# Patient Record
Sex: MALE | Race: WHITE | URBAN - METROPOLITAN AREA
[De-identification: names, ages, dates, MRNs, and addresses within clinical notes are randomized per-mention and may not be internally consistent; named-entity substitution may affect disease eponyms.]

---

## 2017-10-10 ENCOUNTER — TRANSFERRED RECORDS (OUTPATIENT)
Dept: HEALTH INFORMATION MANAGEMENT | Facility: CLINIC | Age: 67
End: 2017-10-10

## 2017-10-12 ENCOUNTER — MEDICAL CORRESPONDENCE (OUTPATIENT)
Dept: HEALTH INFORMATION MANAGEMENT | Facility: CLINIC | Age: 67
End: 2017-10-12

## 2017-10-16 ENCOUNTER — MEDICAL CORRESPONDENCE (OUTPATIENT)
Dept: HEALTH INFORMATION MANAGEMENT | Facility: CLINIC | Age: 67
End: 2017-10-16

## 2018-04-12 NOTE — TELEPHONE ENCOUNTER
FUTURE VISIT INFORMATION      FUTURE VISIT INFORMATION:    Date: 4.16.18    Time: 1:35 PM     Location: [facility]  REFERRAL INFORMATION:    Referring provider:  Dr. Megan Cedillo    Referring providers clinic:  [facility]    Reason for visit/diagnosis  Aspirin Allergy Desensita    RECORDS REQUESTED FROM:       Clinic name Comments Records Status Imaging Status   [facility]  Received N/A                                   RECORDS STATUS      RECORDS RECEIVED FROM: [facility]   DATE RECEIVED: 4.16.18   NOTES STATUS DETAILS   OFFICE NOTE from referring provider Internal- Referral available Dr. Megan Cedillo  10.16.17  Dr. Sylvia Jones  1.30.18   OFFICE NOTE from other specialist N/A    DISCHARGE SUMMARY from hospital N/A    DISCHARGE REPORT from the ER N/A    OPERATIVE REPORT N/A    MEDICATION LIST Internal    IMAGING  (NEED IMAGES AND REPORTS)     CT SCAN N/A    CHEST XRAY (CXR) N/A    TESTS     PULMONARY FUNCTION TESTING (PFT) N/A    FLOW LOOP VOLUME (FVL) N/A    CYSTIC FIBROSIS     CF SPUTUM CULTURE N/A       *Referral notes indicated Aspirin desensitization testing was done greater than 2 years ago.

## 2018-04-16 ENCOUNTER — PRE VISIT (OUTPATIENT)
Dept: PULMONOLOGY | Facility: CLINIC | Age: 68
End: 2018-04-16

## 2018-04-16 ENCOUNTER — OFFICE VISIT (OUTPATIENT)
Dept: PULMONOLOGY | Facility: CLINIC | Age: 68
End: 2018-04-16
Attending: ALLERGY & IMMUNOLOGY
Payer: COMMERCIAL

## 2018-04-16 VITALS
WEIGHT: 199 LBS | RESPIRATION RATE: 16 BRPM | OXYGEN SATURATION: 99 % | HEART RATE: 52 BPM | HEIGHT: 67 IN | DIASTOLIC BLOOD PRESSURE: 67 MMHG | BODY MASS INDEX: 31.23 KG/M2 | SYSTOLIC BLOOD PRESSURE: 116 MMHG

## 2018-04-16 DIAGNOSIS — T50.905D ADVERSE EFFECT OF DRUG, SUBSEQUENT ENCOUNTER: Primary | ICD-10-CM

## 2018-04-16 PROCEDURE — G0463 HOSPITAL OUTPT CLINIC VISIT: HCPCS | Mod: ZF

## 2018-04-16 RX ORDER — RANOLAZINE 500 MG/1
500 TABLET, EXTENDED RELEASE ORAL
COMMUNITY

## 2018-04-16 RX ORDER — NITROGLYCERIN 0.4 MG/1
TABLET SUBLINGUAL
COMMUNITY
Start: 2018-03-12 | End: 2019-03-13

## 2018-04-16 RX ORDER — EPINEPHRINE 0.3 MG/.3ML
0.3 INJECTION SUBCUTANEOUS PRN
COMMUNITY

## 2018-04-16 ASSESSMENT — PAIN SCALES - GENERAL: PAINLEVEL: NO PAIN (0)

## 2018-04-16 NOTE — LETTER
4/16/2018       RE: Fatou Caballero  [address] [address]     Dear Colleague,    Thank you for referring your patient, Fatou Caballero, to the [facility] at [facility] Please see a copy of my visit note below.    Reason for Visit  Fatou Caballero is a 67 year old male who is here for follow-up for ASA desensitization.      Allergy HPI  He had knee surgery and they accidentally stopped his ASA when he was hospitalized for 3 days.  No recent adverse reactions and he tolerated the ASA desensitization 2 years ago w/o any issues.  Reviewing his initial rxn it was immediate throat, eye and facial swelling with sense airway was closing.    The patient was seen and examined by Sumanth Manuel MD   Current Outpatient Prescriptions   Medication     ranolazine (RANEXA) 500 MG 12 hr tablet     METOPROLOL TARTRATE PO     AMLODIPINE BESYLATE PO     EPINEPHrine (EPIPEN/ADRENACLICK/OR ANY BX GENERIC EQUIV) 0.3 MG/0.3ML injection 2-pack     TAMSULOSIN HCL PO     VITAMIN D, CHOLECALCIFEROL, PO     Coenzyme Q10 (CO Q 10 PO)     nitroGLYcerin (NITROSTAT) 0.4 MG sublingual tablet     COMPOUNDED NON-CONTROLLED SUBSTANCE (CMPD RX) - PHARMACY TO MIX COMPOUNDED MEDICATION     ISOSORBIDE MONONITRATE PO     METFORMIN HCL PO     OMEPRAZOLE PO     SIMVASTATIN PO     No current facility-administered medications for this visit.      Allergies   Allergen Reactions     Aspirin      Azithromycin      Gabapentin      Ibuprofen      Sulfamethoxazole-Trimethoprim      Social History     Social History     Marital status:      Spouse name: N/A     Number of children: N/A     Years of education: N/A     Occupational History     Not on file.     Social History Main Topics     Smoking status: Former Smoker     Smokeless tobacco: Never Used     Alcohol use 0.0 oz/week     0 Standard drinks or equivalent per week      Comment: weekly     Drug use: No  "    Sexual activity: Not on file     Other Topics Concern     Not on file     Social History Narrative     Past Medical History:   Diagnosis Date     Basal cell carcinoma      Past Surgical History:   Procedure Laterality Date     MOHS MICROGRAPHIC PROCEDURE       Family History   Problem Relation Age of Onset     Thyroid Disease No family hx of          ROS   A complete ROS was otherwise negative except as noted in the HPI.  /67  Pulse 52  Resp 16  Ht 1.702 m (5' 7\")  Wt 90.3 kg (199 lb)  SpO2 99%  BMI 31.17 kg/m2  Exam:   GENERAL APPEARANCE: Well developed, well nourished, alert, and in no apparent distress.  EYES: EOMI, conjunctiva clear non-injected  HENT: No discharge.  MOUTH: Oral mucosa is moist, without any lesions, no tonsillar enlargement, no oropharyngeal exudate.  RESP: Good air flow throughout.  No crackles. No rhonchi. No wheezes.  CV: Normal S1, S2, regular rhythm, normal rate. No murmur.  No rub. No gallop. No LE edema.   MS: Extremities normal. No clubbing. No cyanosis.  SKIN: No rashes noted.  NEURO: Speech normal, normal strength and tone, normal gait and stance  PSYCH: Normal mentation, orientation to person, place, and time.  Results:      Assessment and plan: Fatou did well during the last 2 desensitizations so we will do the same protocol but we will do it in the clinic.      Per American Academy of Allergy, Asthma and Immunology Drug Practice Parameter:  Rapid Aspirin Challenge/Desensitization Protocol for  Patients With Coronary Artery Disease Requiring Aspirin  Timea Aspirin dose, mg   0           0.1  15         0.3  30         1  45         3  60        10  75        20  90        40  105      81  every 20 minutes we will increase the dose.      Again, thank you for allowing me to participate in the care of your patient.      Sincerely,    Sumanth Manuel MD  "

## 2018-04-16 NOTE — MR AVS SNAPSHOT
"              After Visit Summary   4/16/2018    Fatou Caballero    MRN: [id number]           Patient Information     Date Of Birth          1950        Visit Information        Provider Department      4/16/2018 1:35 PM Sumanth Dela Cruz MD [facility]        Today's Diagnoses     Adverse effect of drug, subsequent encounter    -  1       Follow-ups after your visit        Follow-up notes from your care team     Return in about 4 weeks (around 5/14/2018).      Your next 10 appointments already scheduled     May 07, 2018  7:55 AM CDT   (Arrive by 7:40 AM)   Return Allergy with Sumanth Dela Cruz MD   [facility] ([facility])    [address]  [address] [place] [id number] [phone]           Do not take anti-histamines or Zantac for seven day prior to your appointment.              Who to contact     If you have questions or need follow up information about today's clinic visit or your schedule please contact [facility] directly at [phone].  Normal or non-critical lab and imaging results will be communicated to you by Picocenthart, letter or phone within 4 business days after the clinic has received the results. If you do not hear from us within 7 days, please contact the clinic through Picocenthart or phone. If you have a critical or abnormal lab result, we will notify you by phone as soon as possible.  Submit refill requests through [email or web address] or call your pharmacy and they will forward the refill request to us. Please allow 3 business days for your refill to be completed.          Additional Information About Your Visit        Picocenthart Information     [email or web address] lets you send messages to your doctor, view your test results, renew your prescriptions, schedule appointments and more. To sign up, go to [email or web address] . Click on \"Log in\" on the left side of the " "screen, which will take you to the Welcome page. Then click on \"Sign up Now\" on the right side of the page.     You will be asked to enter the access code listed below, as well as some personal information. Please follow the directions to create your username and password.     Your access code is: [email or web address]  Expires: 2018  7:43 AM     Your access code will  in 90 days. If you need help or a new code, please call your [facility] or [phone].        Care EveryWhere ID     This is your Care EveryWhere ID. This could be used by other organizations to access your [place] medical records  [id number]        Your Vitals Were     Pulse Respirations Height Pulse Oximetry BMI (Body Mass Index)       52 16 1.702 m (5' 7\") 99% 31.17 kg/m2        Blood Pressure from Last 3 Encounters:   18 116/67   12/29/15 122/74   12/09/15 130/67    Weight from Last 3 Encounters:   18 90.3 kg (199 lb)   12/29/15 93.4 kg (205 lb 14.6 oz)   12/09/15 95.7 kg (211 lb)              Today, you had the following     No orders found for display         Today's Medication Changes          These changes are accurate as of 18 11:59 PM.  If you have any questions, ask your nurse or doctor.               Start taking these medicines.        Dose/Directions    COMPOUNDED NON-CONTROLLED SUBSTANCE - PHARMACY TO MIX COMPOUNDED MEDICATION   Commonly known as:  CMPD RX   Used for:  Adverse effect of drug, subsequent encounter   Started by:  Sumanth Dela Cruz MD        Aspirin doses in m.1, 0.3, 1, 3, 10 , 20, 40, 81  For oral desensitization   Quantity:  1 Box   Refills:  0            Where to get your medicines      Some of these will need a paper prescription and others can be bought over the counter.  Ask your nurse if you have questions.     Bring a paper prescription for each of these medications     COMPOUNDED NON-CONTROLLED SUBSTANCE - PHARMACY TO MIX COMPOUNDED MEDICATION                Primary Care " Provider Fax #    Physician No Ref-Primary [phone]       No address on file        Equal Access to Services     HERMILO KIM : Hadii ban juarez salina Fontenot, michelle garcia, john mejia, ivon mendenhall mateuszmia liu lahernankhris cisneros. So [facility] [phone].    ATENCIÓN: Si habla español, tiene a huffman disposición servicios gratuitos de asistencia lingüística. Mireilleame al [phone].    We comply with applicable federal civil rights laws and [place] laws. We do not discriminate on the basis of race, color, national origin, age, disability, sex, sexual orientation, or gender identity.            Thank you!     Thank you for choosing [facility]  for your care. Our goal is always to provide you with excellent care. Hearing back from our patients is one way we can continue to improve our services. Please take a few minutes to complete the written survey that you may receive in the mail after your visit with us. Thank you!             Your Updated Medication List - Protect others around you: Learn how to safely use, store and throw away your medicines at www.disposemymeds.org.          This list is accurate as of 18 11:59 PM.  Always use your most recent med list.                   Brand Name Dispense Instructions for use Diagnosis    AMLODIPINE BESYLATE PO      Take 5 mg by mouth        CO Q 10 PO           COMPOUNDED NON-CONTROLLED SUBSTANCE - PHARMACY TO MIX COMPOUNDED MEDICATION    CMPD RX    1 Box    Aspirin doses in m.1, 0.3, 1, 3, 10 , 20, 40, 81  For oral desensitization    Adverse effect of drug, subsequent encounter       EPINEPHrine 0.3 MG/0.3ML injection 2-pack    EPIPEN/ADRENACLICK/or ANY BX GENERIC EQUIV     Inject 0.3 mg into the muscle as needed for anaphylaxis        ISOSORBIDE MONONITRATE PO      Take 30 mg by mouth daily        METFORMIN HCL PO      Take 500 mg by mouth        METOPROLOL TARTRATE PO           nitroGLYcerin 0.4 MG sublingual tablet     NITROSTAT     DISSOLVE ONE TABLET UNDER THE TONGUE EVERY DAY AS NEEDED FOR CHEST PAIN * MAY REPEAT EVERY 5 MINUTES--NO MORE THAN 3 TOTAL        OMEPRAZOLE PO      Take 20 mg by mouth        ranolazine 500 MG 12 hr tablet    RANEXA     Take 500 mg by mouth        SIMVASTATIN PO      Take 40 mg by mouth        TAMSULOSIN HCL PO           VITAMIN D (CHOLECALCIFEROL) PO      Take by mouth daily

## 2018-04-16 NOTE — NURSING NOTE
Chief Complaint   Patient presents with     Consult     Allergy desensitize to aspirin     Salvador Cordova CMA

## 2018-04-16 NOTE — PROGRESS NOTES
Reason for Visit  Fatou Caballero is a 67 year old male who is here for follow-up for ASA desensitization.      Allergy HPI  He had knee surgery and they accidentally stopped his ASA when he was hospitalized for 3 days.  No recent adverse reactions and he tolerated the ASA desensitization 2 years ago w/o any issues.  Reviewing his initial rxn it was immediate throat, eye and facial swelling with sense airway was closing.    The patient was seen and examined by Sumanth Manuel MD   Current Outpatient Prescriptions   Medication     ranolazine (RANEXA) 500 MG 12 hr tablet     METOPROLOL TARTRATE PO     AMLODIPINE BESYLATE PO     EPINEPHrine (EPIPEN/ADRENACLICK/OR ANY BX GENERIC EQUIV) 0.3 MG/0.3ML injection 2-pack     TAMSULOSIN HCL PO     VITAMIN D, CHOLECALCIFEROL, PO     Coenzyme Q10 (CO Q 10 PO)     nitroGLYcerin (NITROSTAT) 0.4 MG sublingual tablet     COMPOUNDED NON-CONTROLLED SUBSTANCE (CMPD RX) - PHARMACY TO MIX COMPOUNDED MEDICATION     ISOSORBIDE MONONITRATE PO     METFORMIN HCL PO     OMEPRAZOLE PO     SIMVASTATIN PO     No current facility-administered medications for this visit.      Allergies   Allergen Reactions     Aspirin      Azithromycin      Gabapentin      Ibuprofen      Sulfamethoxazole-Trimethoprim      Social History     Social History     Marital status:      Spouse name: N/A     Number of children: N/A     Years of education: N/A     Occupational History     Not on file.     Social History Main Topics     Smoking status: Former Smoker     Smokeless tobacco: Never Used     Alcohol use 0.0 oz/week     0 Standard drinks or equivalent per week      Comment: weekly     Drug use: No     Sexual activity: Not on file     Other Topics Concern     Not on file     Social History Narrative     Past Medical History:   Diagnosis Date     Basal cell carcinoma      Past Surgical History:   Procedure Laterality Date     MOHS MICROGRAPHIC PROCEDURE       Family History   Problem Relation Age of  "Onset     Thyroid Disease No family hx of          ROS   A complete ROS was otherwise negative except as noted in the HPI.  /67  Pulse 52  Resp 16  Ht 1.702 m (5' 7\")  Wt 90.3 kg (199 lb)  SpO2 99%  BMI 31.17 kg/m2  Exam:   GENERAL APPEARANCE: Well developed, well nourished, alert, and in no apparent distress.  EYES: EOMI, conjunctiva clear non-injected  HENT: No discharge.  MOUTH: Oral mucosa is moist, without any lesions, no tonsillar enlargement, no oropharyngeal exudate.  RESP: Good air flow throughout.  No crackles. No rhonchi. No wheezes.  CV: Normal S1, S2, regular rhythm, normal rate. No murmur.  No rub. No gallop. No LE edema.   MS: Extremities normal. No clubbing. No cyanosis.  SKIN: No rashes noted.  NEURO: Speech normal, normal strength and tone, normal gait and stance  PSYCH: Normal mentation, orientation to person, place, and time.  Results:      Assessment and plan: Fatou did well during the last 2 desensitizations so we will do the same protocol but we will do it in the clinic.      Per American Academy of Allergy, Asthma and Immunology Drug Practice Parameter:  Rapid Aspirin Challenge/Desensitization Protocol for  Patients With Coronary Artery Disease Requiring Aspirin  Timea Aspirin dose, mg   0           0.1  15         0.3  30         1  45         3  60        10  75        20  90        40  105      81  every 20 minutes we will increase the dose.    "

## 2018-05-07 ENCOUNTER — OFFICE VISIT (OUTPATIENT)
Dept: PULMONOLOGY | Facility: CLINIC | Age: 68
End: 2018-05-07
Attending: ALLERGY & IMMUNOLOGY
Payer: COMMERCIAL

## 2018-05-07 VITALS
BODY MASS INDEX: 31.39 KG/M2 | WEIGHT: 200 LBS | HEART RATE: 45 BPM | RESPIRATION RATE: 18 BRPM | DIASTOLIC BLOOD PRESSURE: 62 MMHG | SYSTOLIC BLOOD PRESSURE: 103 MMHG | OXYGEN SATURATION: 98 % | HEIGHT: 67 IN

## 2018-05-07 DIAGNOSIS — T50.905D ADVERSE EFFECT OF DRUG, SUBSEQUENT ENCOUNTER: Primary | ICD-10-CM

## 2018-05-07 PROCEDURE — G0463 HOSPITAL OUTPT CLINIC VISIT: HCPCS | Mod: ZF

## 2018-05-07 PROCEDURE — 95076 INGEST CHALLENGE INI 120 MIN: CPT | Mod: ZF | Performed by: ALLERGY & IMMUNOLOGY

## 2018-05-07 ASSESSMENT — PAIN SCALES - GENERAL: PAINLEVEL: NO PAIN (0)

## 2018-05-07 NOTE — LETTER
5/7/2018       RE: Fatou Caballero  [address] [address]     Dear Colleague,    Thank you for referring your patient, Fatou Caballero, to the [facility] at [facility]. Please see a copy of my visit note below.    Reason for Visit  Fatou Caballero is a 67 year old male who is here for follow-up for asa desensitization.      Allergy HPI  Feeling well today, here for asa desensitization.      The patient was seen and examined by Sumanth Manuel MD   Current Outpatient Prescriptions   Medication     AMLODIPINE BESYLATE PO     Coenzyme Q10 (CO Q 10 PO)     COMPOUNDED NON-CONTROLLED SUBSTANCE (CMPD RX) - PHARMACY TO MIX COMPOUNDED MEDICATION     EPINEPHrine (EPIPEN/ADRENACLICK/OR ANY BX GENERIC EQUIV) 0.3 MG/0.3ML injection 2-pack     ISOSORBIDE MONONITRATE PO     METFORMIN HCL PO     METOPROLOL TARTRATE PO     nitroGLYcerin (NITROSTAT) 0.4 MG sublingual tablet     OMEPRAZOLE PO     ranolazine (RANEXA) 500 MG 12 hr tablet     SIMVASTATIN PO     TAMSULOSIN HCL PO     VITAMIN D, CHOLECALCIFEROL, PO     No current facility-administered medications for this visit.      Allergies   Allergen Reactions     Aspirin      Azithromycin      Gabapentin      Ibuprofen      Sulfamethoxazole-Trimethoprim      Social History     Social History     Marital status:      Spouse name: N/A     Number of children: N/A     Years of education: N/A     Occupational History     Not on file.     Social History Main Topics     Smoking status: Former Smoker     Smokeless tobacco: Never Used     Alcohol use 0.0 oz/week     0 Standard drinks or equivalent per week      Comment: weekly     Drug use: No     Sexual activity: Not on file     Other Topics Concern     Not on file     Social History Narrative     Past Medical History:   Diagnosis Date     Basal cell carcinoma      Past Surgical History:   Procedure Laterality Date     MOHS MICROGRAPHIC  "PROCEDURE       Family History   Problem Relation Age of Onset     Thyroid Disease No family hx of          ROS   A complete ROS was otherwise negative except as noted in the HPI.  /62  Pulse (!) 45  Resp 18  Ht 1.702 m (5' 7\")  Wt 90.7 kg (200 lb)  SpO2 98%  BMI 31.32 kg/m2  Exam:   GENERAL APPEARANCE: Well developed, well nourished, alert, and in no apparent distress.  EYES: EOMI, conjunctiva clear non-injected  HENT: Nasal mucosa with no edema and no discharge. Nasal polyp in the left nare.    MOUTH: Oral mucosa is moist, without any lesions, no tonsillar enlargement, no oropharyngeal exudate.  RESP: Good air flow throughout.  No crackles. No rhonchi. No wheezes.  CV: Normal S1, S2, regular rhythm, normal rate. No murmur.  No rub. No gallop. No LE edema.   MS: Extremities normal. No clubbing. No cyanosis.  SKIN: No rashes noted.  NEURO: Speech normal, normal strength and tone, normal gait and stance  PSYCH: Normal mentation, orientation to person, place, and time.  Results:      Assessment and plan:  He is here for aspirin desensitization for an acute anaphylactic reaction to aspirin in the past.  Of note on exam today he does have nasal polyps.  He denies many nasal symptoms or chronic infections. The following doses were given and he was examined before every increase in dose by the MD.  He should continue daily aspirin 81 mg and if he missed more than 2 days he will notify us.  If someone wants to take him off this please notify us prior.      Time (a.m.) Aspirin dose, mg   7:37         0.1  7:55         0.3  8:13         1  8:32       3  8:52       10  9:10        20  9:31        40  9:50     81  Discharged in stable condition 10:25     Total time of visit was 3 hours.     Again, thank you for allowing me to participate in the care of your patient.      Sincerely,    Sumanth Manuel MD  "

## 2018-05-07 NOTE — NURSING NOTE
Chief Complaint   Patient presents with     RECHECK     Aspirin Desensitization     Alber Bellamy

## 2018-05-07 NOTE — PROGRESS NOTES
"Reason for Visit  Fatou Caballero is a 67 year old male who is here for follow-up for asa desensitization.      Allergy HPI  Feeling well today, here for asa desensitization.      The patient was seen and examined by Sumanth Manuel MD   Current Outpatient Prescriptions   Medication     AMLODIPINE BESYLATE PO     Coenzyme Q10 (CO Q 10 PO)     COMPOUNDED NON-CONTROLLED SUBSTANCE (CMPD RX) - PHARMACY TO MIX COMPOUNDED MEDICATION     EPINEPHrine (EPIPEN/ADRENACLICK/OR ANY BX GENERIC EQUIV) 0.3 MG/0.3ML injection 2-pack     ISOSORBIDE MONONITRATE PO     METFORMIN HCL PO     METOPROLOL TARTRATE PO     nitroGLYcerin (NITROSTAT) 0.4 MG sublingual tablet     OMEPRAZOLE PO     ranolazine (RANEXA) 500 MG 12 hr tablet     SIMVASTATIN PO     TAMSULOSIN HCL PO     VITAMIN D, CHOLECALCIFEROL, PO     No current facility-administered medications for this visit.      Allergies   Allergen Reactions     Aspirin      Azithromycin      Gabapentin      Ibuprofen      Sulfamethoxazole-Trimethoprim      Social History     Social History     Marital status:      Spouse name: N/A     Number of children: N/A     Years of education: N/A     Occupational History     Not on file.     Social History Main Topics     Smoking status: Former Smoker     Smokeless tobacco: Never Used     Alcohol use 0.0 oz/week     0 Standard drinks or equivalent per week      Comment: weekly     Drug use: No     Sexual activity: Not on file     Other Topics Concern     Not on file     Social History Narrative     Past Medical History:   Diagnosis Date     Basal cell carcinoma      Past Surgical History:   Procedure Laterality Date     MOHS MICROGRAPHIC PROCEDURE       Family History   Problem Relation Age of Onset     Thyroid Disease No family hx of          ROS   A complete ROS was otherwise negative except as noted in the HPI.  /62  Pulse (!) 45  Resp 18  Ht 1.702 m (5' 7\")  Wt 90.7 kg (200 lb)  SpO2 98%  BMI 31.32 kg/m2  Exam:   GENERAL " APPEARANCE: Well developed, well nourished, alert, and in no apparent distress.  EYES: EOMI, conjunctiva clear non-injected  HENT: Nasal mucosa with no edema and no discharge. Nasal polyp in the left nare.    MOUTH: Oral mucosa is moist, without any lesions, no tonsillar enlargement, no oropharyngeal exudate.  RESP: Good air flow throughout.  No crackles. No rhonchi. No wheezes.  CV: Normal S1, S2, regular rhythm, normal rate. No murmur.  No rub. No gallop. No LE edema.   MS: Extremities normal. No clubbing. No cyanosis.  SKIN: No rashes noted.  NEURO: Speech normal, normal strength and tone, normal gait and stance  PSYCH: Normal mentation, orientation to person, place, and time.  Results:      Assessment and plan:  He is here for aspirin desensitization for an acute anaphylactic reaction to aspirin in the past.  Of note on exam today he does have nasal polyps.  He denies many nasal symptoms or chronic infections. The following doses were given and he was examined before every increase in dose by the MD.  He should continue daily aspirin 81 mg and if he missed more than 2 days he will notify us.  If someone wants to take him off this please notify us prior.      Time (a.m.) Aspirin dose, mg   7:37         0.1  7:55         0.3  8:13         1  8:32       3  8:52       10  9:10        20  9:31        40  9:50     81  Discharged in stable condition 10:25     Total time of visit was 3 hours.   .

## 2018-05-07 NOTE — PATIENT INSTRUCTIONS
He should continue daily baby aspirin (81 mg) and if he missed more than 2 days he will notify us.  If someone wants to take him off this please notify us prior.

## 2018-05-07 NOTE — MR AVS SNAPSHOT
"              After Visit Summary   5/7/2018    Fatou Caballero    MRN: [id number]           Patient Information     Date Of Birth          1950        Visit Information        Provider Department      5/7/2018 7:55 AM Sumanth Dela Cruz MD [facility]        Today's Diagnoses     Adverse effect of drug, subsequent encounter    -  1      Care Instructions     He should continue daily baby aspirin (81 mg) and if he missed more than 2 days he will notify us.  If someone wants to take him off this please notify us prior.              Follow-ups after your visit        Who to contact     If you have questions or need follow up information about today's clinic visit or your schedule please contact [facility] directly at [phone].  Normal or non-critical lab and imaging results will be communicated to you by [employer], letter or phone within 4 business days after the clinic has received the results. If you do not hear from us within 7 days, please contact the clinic through [employer] or phone. If you have a critical or abnormal lab result, we will notify you by phone as soon as possible.  Submit refill requests through [employer] or call your pharmacy and they will forward the refill request to us. Please allow 3 business days for your refill to be completed.          Additional Information About Your Visit        [employer] Information     [employer] lets you send messages to your doctor, view your test results, renew your prescriptions, schedule appointments and more. To sign up, go to [email or web address] . Click on \"Log in\" on the left side of the screen, which will take you to the Welcome page. Then click on \"Sign up Now\" on the right side of the page.     You will be asked to enter the access code listed below, as well as some personal information. Please follow the directions to create your username and password.     Your access code is: " [email or web address]  Expires: 2018  7:43 AM     Your access code will  in 90 days. If you need help or a new code, please call your [facility] or [phone].        Care EveryWhere ID     This is your Care EveryWhere ID. This could be used by other organizations to access your [place] medical records  [id number]        Your Vitals Were     Pulse Respirations Height Pulse Oximetry BMI (Body Mass Index)       45 18 1.702 m (5' 7\") 98% 31.32 kg/m2        Blood Pressure from Last 3 Encounters:   18 103/62   18 116/67   12/29/15 122/74    Weight from Last 3 Encounters:   18 90.7 kg (200 lb)   18 90.3 kg (199 lb)   12/29/15 93.4 kg (205 lb 14.6 oz)              We Performed the Following     Ingestion challenge test-up to 120 minutes        Primary Care Provider Fax #    Physician No Ref-Primary [phone]       No address on file        Equal Access to Services     [facility]: Hadii aad ku hadasho Sorodolfo, waaxda luqadaha, qaybta kaalmada adeegyajoya, ivon nielson . So [facility] [phone].    ATENCIÓN: Si habla español, tiene a huffman disposición servicios gratuitos de asistencia lingüística. Llame al [phone].    We comply with applicable federal civil rights laws and [place] laws. We do not discriminate on the basis of race, color, national origin, age, disability, sex, sexual orientation, or gender identity.            Thank you!     Thank you for choosing [facility]  for your care. Our goal is always to provide you with excellent care. Hearing back from our patients is one way we can continue to improve our services. Please take a few minutes to complete the written survey that you may receive in the mail after your visit with us. Thank you!             Your Updated Medication List - Protect others around you: Learn how to safely use, store and throw away your medicines at www.disposemymeds.org.          This list is " accurate as of 18 10:27 AM.  Always use your most recent med list.                   Brand Name Dispense Instructions for use Diagnosis    AMLODIPINE BESYLATE PO      Take 5 mg by mouth        CO Q 10 PO           COMPOUNDED NON-CONTROLLED SUBSTANCE - PHARMACY TO MIX COMPOUNDED MEDICATION    CMPD RX    1 Box    Aspirin doses in m.1, 0.3, 1, 3, 10 , 20, 40, 81  For oral desensitization    Adverse effect of drug, subsequent encounter       EPINEPHrine 0.3 MG/0.3ML injection 2-pack    EPIPEN/ADRENACLICK/or ANY BX GENERIC EQUIV     Inject 0.3 mg into the muscle as needed for anaphylaxis        ISOSORBIDE MONONITRATE PO      Take 30 mg by mouth daily        METFORMIN HCL PO      Take 500 mg by mouth        METOPROLOL TARTRATE PO           nitroGLYcerin 0.4 MG sublingual tablet    NITROSTAT     DISSOLVE ONE TABLET UNDER THE TONGUE EVERY DAY AS NEEDED FOR CHEST PAIN * MAY REPEAT EVERY 5 MINUTES--NO MORE THAN 3 TOTAL        OMEPRAZOLE PO      Take 20 mg by mouth        ranolazine 500 MG 12 hr tablet    RANEXA     Take 500 mg by mouth        SIMVASTATIN PO      Take 40 mg by mouth        TAMSULOSIN HCL PO           VITAMIN D (CHOLECALCIFEROL) PO      Take by mouth daily